# Patient Record
Sex: MALE | Race: WHITE
[De-identification: names, ages, dates, MRNs, and addresses within clinical notes are randomized per-mention and may not be internally consistent; named-entity substitution may affect disease eponyms.]

---

## 2019-10-18 ENCOUNTER — HOSPITAL ENCOUNTER (EMERGENCY)
Dept: HOSPITAL 53 - M ED | Age: 19
Discharge: HOME | End: 2019-10-18
Payer: COMMERCIAL

## 2019-10-18 VITALS — HEIGHT: 73 IN | WEIGHT: 193.12 LBS | BODY MASS INDEX: 25.6 KG/M2

## 2019-10-18 VITALS — DIASTOLIC BLOOD PRESSURE: 71 MMHG | SYSTOLIC BLOOD PRESSURE: 128 MMHG
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
## 2019-10-18 DIAGNOSIS — S02.2XXA: Primary | ICD-10-CM
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
## 2019-10-18 DIAGNOSIS — S60.222A: ICD-10-CM

## 2019-10-18 DIAGNOSIS — S60.221A: ICD-10-CM

## 2019-10-18 NOTE — REP
Bilateral hand series:  Eight views.

 

History:  Injury in a fall.

 

Findings:  Four views of the left hand are compared with the 11/20/2005 prior

study.  Overall mineralization pattern is normal.  No fracture or subluxation is

seen.

 

Four views right hand show overall normal mineralization as well.  There is no

fracture or subluxation seen.

 

Impression:

 

Negative bilateral hand radiographs.  No fracture noted.

 

 

Electronically Signed by

Brayden Gibbs MD 10/18/2019 10:07 A

## 2019-10-18 NOTE — REP
CT facial bones:  10/18/2019.

 

Indication:  Face trauma.

 

Comparison:  None.

 

Technique:  Unenhanced axial images of the facial bones were obtained with

sagittal and coronal reconstructions provided.

 

Findings:

 

Comminuted minimally displaced bilateral nasal bone fractures are present with

rightward angulation. No additional facial bone fractures are present.  Right

maxillary retention cysts are present.  There is minimal periosteal mucosal

thickening and a tiny retention cyst within the left maxillary sinus.  No acute

ocular or intraorbital abnormalities are present.  The TMJs are unremarkable.

The mastoid air cells are clear.

 

Impression:

 

Bilateral nasal bone fractures.

 

 

Electronically Signed by

Donis Fabian DO 10/18/2019 10:07 A

## 2019-10-18 NOTE — REP
CT brain:  10/19/2019.

 

Indication:  Head trauma.

 

Comparison:  None.

 

Technique:  Unenhanced axial CT images of the brain were obtained from skull base

to vertex.

 

Findings:

 

There is no acute intracranial hemorrhage, acute cortical infarction, mass

effect, hydrocephalus or acute calvarial fracture.  Small left frontal region

scalp hematoma.

 

Impression:

 

No acute intracranial process.

 

 

Electronically Signed by

Donis Fabian DO 10/18/2019 10:00 A

## 2020-04-09 ENCOUNTER — HOSPITAL ENCOUNTER (OUTPATIENT)
Dept: HOSPITAL 53 - M LAB REF | Age: 20
End: 2020-04-09
Attending: NURSE PRACTITIONER
Payer: COMMERCIAL

## 2020-04-09 DIAGNOSIS — R45.4: Primary | ICD-10-CM

## 2020-04-09 DIAGNOSIS — F41.8: ICD-10-CM

## 2020-04-09 DIAGNOSIS — Z13.9: ICD-10-CM

## 2020-04-09 DIAGNOSIS — F12.10: ICD-10-CM

## 2020-04-09 DIAGNOSIS — F10.129: ICD-10-CM

## 2020-04-09 LAB
25(OH)D3 SERPL-MCNC: 18 NG/ML (ref 30–100)
ALBUMIN SERPL BCG-MCNC: 4.6 GM/DL (ref 3.2–5.2)
ALT SERPL W P-5'-P-CCNC: 29 U/L (ref 12–78)
BASOPHILS # BLD AUTO: 0 10^3/UL (ref 0–0.2)
BASOPHILS NFR BLD AUTO: 0.8 % (ref 0–1)
BILIRUB SERPL-MCNC: 1.4 MG/DL (ref 0.2–1)
BUN SERPL-MCNC: 12 MG/DL (ref 7–18)
CALCIUM SERPL-MCNC: 9.6 MG/DL (ref 8.5–10.1)
CHLORIDE SERPL-SCNC: 108 MEQ/L (ref 98–107)
CHOLEST SERPL-MCNC: 174 MG/DL (ref ?–200)
CHOLEST/HDLC SERPL: 2.45 {RATIO} (ref ?–5)
CO2 SERPL-SCNC: 27 MEQ/L (ref 21–32)
CREAT SERPL-MCNC: 1.03 MG/DL (ref 0.7–1.3)
EOSINOPHIL # BLD AUTO: 0.1 10^3/UL (ref 0–0.5)
EOSINOPHIL NFR BLD AUTO: 2.1 % (ref 0–3)
EST. AVERAGE GLUCOSE BLD GHB EST-MCNC: 100 MG/DL (ref 60–110)
GLUCOSE SERPL-MCNC: 101 MG/DL (ref 70–100)
HCT VFR BLD AUTO: 42.9 % (ref 42–52)
HDLC SERPL-MCNC: 71 MG/DL (ref 40–?)
HGB BLD-MCNC: 14.4 G/DL (ref 13.5–17.5)
LDLC SERPL CALC-MCNC: 87 MG/DL (ref ?–100)
LYMPHOCYTES # BLD AUTO: 1.8 10^3/UL (ref 1.5–5)
LYMPHOCYTES NFR BLD AUTO: 34.4 % (ref 24–44)
MCH RBC QN AUTO: 29.8 PG (ref 27–33)
MCHC RBC AUTO-ENTMCNC: 33.6 G/DL (ref 32–36.5)
MCV RBC AUTO: 88.6 FL (ref 80–96)
MONOCYTES # BLD AUTO: 0.5 10^3/UL (ref 0–0.8)
MONOCYTES NFR BLD AUTO: 8.8 % (ref 0–5)
NEUTROPHILS # BLD AUTO: 2.8 10^3/UL (ref 1.5–8.5)
NEUTROPHILS NFR BLD AUTO: 53.7 % (ref 36–66)
NONHDLC SERPL-MCNC: 103 MG/DL
PLATELET # BLD AUTO: 217 10^3/UL (ref 150–450)
POTASSIUM SERPL-SCNC: 3.8 MEQ/L (ref 3.5–5.1)
PROT SERPL-MCNC: 7.8 GM/DL (ref 6.4–8.2)
RBC # BLD AUTO: 4.84 10^6/UL (ref 4.3–6.1)
SODIUM SERPL-SCNC: 141 MEQ/L (ref 136–145)
T4 FREE SERPL-MCNC: 1.08 NG/DL (ref 0.78–1.33)
TRIGL SERPL-MCNC: 79 MG/DL (ref ?–150)
TSH SERPL DL<=0.005 MIU/L-ACNC: 1.5 UIU/ML (ref 0.46–3.98)
WBC # BLD AUTO: 5.2 10^3/UL (ref 4–10)

## 2023-04-28 ENCOUNTER — HOSPITAL ENCOUNTER (OUTPATIENT)
Dept: HOSPITAL 53 - M WUC | Age: 23
End: 2023-04-28
Attending: PHYSICIAN ASSISTANT
Payer: COMMERCIAL

## 2023-04-28 DIAGNOSIS — S60.221A: Primary | ICD-10-CM
